# Patient Record
Sex: MALE | Race: WHITE | NOT HISPANIC OR LATINO | URBAN - METROPOLITAN AREA
[De-identification: names, ages, dates, MRNs, and addresses within clinical notes are randomized per-mention and may not be internally consistent; named-entity substitution may affect disease eponyms.]

---

## 2022-11-07 ENCOUNTER — EMERGENCY (EMERGENCY)
Facility: HOSPITAL | Age: 32
LOS: 1 days | Discharge: HOME | End: 2022-11-07
Attending: EMERGENCY MEDICINE | Admitting: EMERGENCY MEDICINE
Payer: OTHER MISCELLANEOUS

## 2022-11-07 VITALS
SYSTOLIC BLOOD PRESSURE: 119 MMHG | OXYGEN SATURATION: 99 % | HEIGHT: 70 IN | WEIGHT: 145.06 LBS | HEART RATE: 72 BPM | TEMPERATURE: 98 F | DIASTOLIC BLOOD PRESSURE: 73 MMHG | RESPIRATION RATE: 18 BRPM

## 2022-11-07 PROCEDURE — 73620 X-RAY EXAM OF FOOT: CPT | Mod: 26

## 2022-11-07 PROCEDURE — 99283 EMERGENCY DEPT VISIT LOW MDM: CPT | Mod: 25

## 2022-11-07 PROCEDURE — 73630 X-RAY EXAM OF FOOT: CPT | Mod: 26,RT

## 2022-11-07 RX ORDER — AZTREONAM 2 G
1 VIAL (EA) INJECTION
Qty: 20 | Refills: 0
Start: 2022-11-07 | End: 2022-11-16

## 2022-11-07 RX ADMIN — Medication 1 TABLET(S): at 15:38

## 2022-11-07 NOTE — ED PROVIDER NOTE - PATIENT PORTAL LINK FT
You can access the FollowMyHealth Patient Portal offered by [facility] by registering at the following website: [email or web address]. By joining [employer]’s FollowMyHealth portal, you will also be able to view your health information using other applications (apps) compatible with our system.

## 2022-11-07 NOTE — ED ADULT TRIAGE NOTE - CHIEF COMPLAINT QUOTE
reports stomping onto a metal nicole with his R foot on 11/4, seen at , given tdap and rx'd Levaquin. returned to  for a followup, given crutches and was told to come into the ED for further eval of R foot pain, not getting better. denies f/c

## 2022-11-07 NOTE — ED PROVIDER NOTE - ADDITIONAL NOTES AND INSTRUCTIONS:
TO WHOM IT MAY CONCERN,   MR. MUNIZ WAS SEEN IN OUT EMERGENCY DEPARTMENT ON 11/7/22 AND WILL NEED TO RETURN HERE FOR FURTHER EVALUATION ON 11/8/22.  PLEASE EXCUSE FROM WORK ON THESE DATES.   THANK YOU,   DR. MONK

## 2022-11-07 NOTE — ED PROVIDER NOTE - PHYSICAL EXAMINATION
VITAL SIGNS: I have reviewed nursing notes and confirm.  CONSTITUTIONAL: Well-developed; well-nourished; in no acute distress.  SKIN: Erythema to the R foot between the 1st and 2nd toe. Puncture site at the base of the R heel with tenderness and erythema to the area.   HEAD: Normocephalic; atraumatic.  EYES: Clear bilaterally.  ENT: No nasal discharge; airway clear.  NECK: Supple; non tender.  CARD: Regular rate and rhythm.  RESP: No respiratory distress.  ABD: Soft; non-distended; non-tender.  MSK: Puncture site at the base of the R heel with tenderness and erythema to the area and difficulty walking secondary to pain.  NEURO: Alert, oriented. Grossly unremarkable.  PSYCH: Cooperative, appropriate. VITAL SIGNS: I have reviewed nursing notes and confirm.  CONSTITUTIONAL: Well-developed; well-nourished; in no acute distress.  SKIN: Erythema to the R foot between the 1st and 2nd toe. Puncture site resulting in ~2mm hematoma to the volar aspect of R distal foot, just between the distal heads of 1st and 2nd MTs.  No TTP over the hematoma with no fluctuance or induration.  +TTP to dorsal aspect.    HEAD: Normocephalic; atraumatic.  EYES: Clear bilaterally.  ENT: No nasal discharge; airway clear.  NECK: Supple; non tender.  CARD: Regular rate and rhythm.  RESP: No respiratory distress.  MSK: Puncture site at the base of the R heel with tenderness and erythema to the area and difficulty walking secondary to pain.  NEURO: Alert, oriented. Grossly unremarkable.  PSYCH: Cooperative, appropriate.

## 2022-11-07 NOTE — ED PROVIDER NOTE - CLINICAL SUMMARY MEDICAL DECISION MAKING FREE TEXT BOX
Levaquin is good coverage for potential pseudomonas given shoe involvement.  However, there might be a early cellulitis developing given other bacteria, like staph/MRSA.  We will start on Bactrim in addition to Levaquin.  Pt reports pain is okay at this point and declines pain medications.  Already has crutches to aid in ambulation.  Discussed RICE.  Will have him return in 24 hours for a wound check.

## 2022-11-07 NOTE — ED PROVIDER NOTE - NSFOLLOWUPINSTRUCTIONS_ED_ALL_ED_FT
RETURN HERE IN 24 HOURS FOR A WOUND CHECK.  YOU MAY NEED AN IV AND IV ANTIBIOTICS IF THIS GETS WORSE.     PLEASE GO TO YOUR PHARMACY TO FILL YOUR PRESCRIPTIONS.  PLEASE START TODAY AND USE AS DIRECTED.  BE SURE TO FINISH YOUR LEVAQUIN IN ADDITION TO THIS NEW MEDICATION.     PLEASE RETURN TO THE ER IMMEDIATELY FOR ANY WORSENING REDNESS, SWELLING, SEVERE PAIN, PUS DRAINAGE, FEVER, OR ANY OTHER CONCERNS FOR INFECTION/WORSENING.

## 2022-11-07 NOTE — ED PROVIDER NOTE - OBJECTIVE STATEMENT
31 yo M, with no PMHx, presenting after he stomped on a metal nicole at work resulting in a puncture wound to the bottom heel of his R foot through the sole of his shoe on Friday. Pt went to  where he was prescribed Levaquin and given Tdap. However, pt reports his pain has not improved. Went to the  again today where he was given crutches and sent here for further evaluation. Pt had no imaging done at the . Denies fever, chills. 33 yo M, with no PMHx, presenting after he stepped on a thin metal nicole at work resulting in a puncture wound to the ball of his R foot.  He was wearing shoes and puncture occurred through the sole of his shoe.  Injury occurred 4 days ago. Pt originally went to  where he was prescribed Levaquin and given Tdap. No Xray obtained.  However, pt reports his pain has not improved. Also notes a mild redness to the area between his 1st and 2nd toes.  Went to the  again today where he was given crutches and sent here for further evaluation. Pt had no imaging done at the . Denies fever, chills.

## 2022-11-08 ENCOUNTER — EMERGENCY (EMERGENCY)
Facility: HOSPITAL | Age: 32
LOS: 1 days | Discharge: HOME | End: 2022-11-08
Attending: EMERGENCY MEDICINE
Payer: OTHER MISCELLANEOUS

## 2022-11-08 VITALS
WEIGHT: 145.06 LBS | SYSTOLIC BLOOD PRESSURE: 121 MMHG | DIASTOLIC BLOOD PRESSURE: 82 MMHG | TEMPERATURE: 98 F | RESPIRATION RATE: 16 BRPM | OXYGEN SATURATION: 99 % | HEIGHT: 70 IN | HEART RATE: 82 BPM

## 2022-11-08 DIAGNOSIS — Z48.01 ENCOUNTER FOR CHANGE OR REMOVAL OF SURGICAL WOUND DRESSING: ICD-10-CM

## 2022-11-08 DIAGNOSIS — L03.116 CELLULITIS OF LEFT LOWER LIMB: ICD-10-CM

## 2022-11-08 PROCEDURE — 99283 EMERGENCY DEPT VISIT LOW MDM: CPT

## 2022-11-08 NOTE — ED PROVIDER NOTE - CLINICAL SUMMARY MEDICAL DECISION MAKING FREE TEXT BOX
31 y/o M presents to the ED for a wound check. Exam is remarkable for a small amount of erythema to the dorsum of foot between 1st and 2nd toes. Pt endorses improvement of symptoms since starting Bactrim. Conservative management discussed with the patient in detail.  Strict ED return precautions were discussed in detail with Pt and he verbalizes understanding and agreement.

## 2022-11-08 NOTE — ED PROVIDER NOTE - PHYSICAL EXAMINATION
CONSTITUTIONAL: Well appearing, well nourished, awake, alert, oriented to person, place, time/situation and in no apparent distress.  ENT: Airway patent.  EYES: Clear bilaterally.  RESPIRATORY: Breathing comfortably with normal RR.  MSK: [R foot] - tiny puncture wound to sole of foot. Non tender. Small amount of erythema to the dorsum of foot between 1st and 2nd toes.  NEURO: Alert and oriented, no focal deficits.  SKIN: [R foot] - tiny puncture wound to sole of foot. Non tender. Small amount of erythema to the dorsum of foot between 1st and 2nd toes.  PSYCH: Alert and oriented to person, place, time/situation. normal mood and affect. no apparent risk to self or others.

## 2022-11-08 NOTE — ED PROVIDER NOTE - WET READ LAUNCH FT
Chief Complaint   Patient presents with     RECHECK     follow up possible injection - worsening tinnitus with a dizzy spell last week      Dada Paula, EMT   There are no Wet Read(s) to document.

## 2022-11-08 NOTE — ED PROVIDER NOTE - OBJECTIVE STATEMENT
33 y/o M with no PMH presents to the ED for a wound check. Pt was seen at this ED yesterday for a puncture wound to the ball of his R foot. He was started on a course of Bactrim which he has been taking as instructed. Pt returns to the ED today as instructed for a wound check. He states the pain is "much better." Pt denies fevers or chills.

## 2022-11-08 NOTE — ED PROVIDER NOTE - PATIENT PORTAL LINK FT
You can access the FollowMyHealth Patient Portal offered by [facility] by registering at the following website: [email or web address]. By joining [employer]’s FollowMyHealth portal, you will also be able to view your health information using other applications (apps) compatible with our system.

## 2022-11-11 DIAGNOSIS — L03.115 CELLULITIS OF RIGHT LOWER LIMB: ICD-10-CM

## 2022-11-11 DIAGNOSIS — M79.671 PAIN IN RIGHT FOOT: ICD-10-CM

## 2022-11-11 PROBLEM — Z78.9 OTHER SPECIFIED HEALTH STATUS: Chronic | Status: ACTIVE | Noted: 2022-11-07
